# Patient Record
Sex: MALE | Race: OTHER | Employment: FULL TIME | URBAN - NONMETROPOLITAN AREA
[De-identification: names, ages, dates, MRNs, and addresses within clinical notes are randomized per-mention and may not be internally consistent; named-entity substitution may affect disease eponyms.]

---

## 2019-11-26 ENCOUNTER — OFFICE VISIT (OUTPATIENT)
Dept: FAMILY MEDICINE CLINIC | Age: 29
End: 2019-11-26
Payer: COMMERCIAL

## 2019-11-26 VITALS
HEIGHT: 70 IN | DIASTOLIC BLOOD PRESSURE: 82 MMHG | BODY MASS INDEX: 37.51 KG/M2 | WEIGHT: 262 LBS | HEART RATE: 77 BPM | OXYGEN SATURATION: 98 % | TEMPERATURE: 98.6 F | SYSTOLIC BLOOD PRESSURE: 132 MMHG

## 2019-11-26 DIAGNOSIS — Z23 NEED FOR INFLUENZA VACCINATION: ICD-10-CM

## 2019-11-26 DIAGNOSIS — Z76.89 ENCOUNTER TO ESTABLISH CARE: Primary | ICD-10-CM

## 2019-11-26 DIAGNOSIS — R06.81 WITNESSED APNEIC SPELLS: ICD-10-CM

## 2019-11-26 PROCEDURE — 90686 IIV4 VACC NO PRSV 0.5 ML IM: CPT | Performed by: FAMILY MEDICINE

## 2019-11-26 PROCEDURE — 99203 OFFICE O/P NEW LOW 30 MIN: CPT | Performed by: FAMILY MEDICINE

## 2019-11-26 PROCEDURE — 90471 IMMUNIZATION ADMIN: CPT | Performed by: FAMILY MEDICINE

## 2019-11-26 ASSESSMENT — ENCOUNTER SYMPTOMS
EYES NEGATIVE: 1
GASTROINTESTINAL NEGATIVE: 1
RESPIRATORY NEGATIVE: 1
ALLERGIC/IMMUNOLOGIC NEGATIVE: 1

## 2019-11-26 ASSESSMENT — PATIENT HEALTH QUESTIONNAIRE - PHQ9
SUM OF ALL RESPONSES TO PHQ QUESTIONS 1-9: 2
SUM OF ALL RESPONSES TO PHQ9 QUESTIONS 1 & 2: 2
1. LITTLE INTEREST OR PLEASURE IN DOING THINGS: 1
2. FEELING DOWN, DEPRESSED OR HOPELESS: 1
SUM OF ALL RESPONSES TO PHQ QUESTIONS 1-9: 2

## 2019-12-17 ENCOUNTER — HOSPITAL ENCOUNTER (OUTPATIENT)
Dept: SLEEP CENTER | Age: 29
Discharge: HOME | End: 2019-12-19
Payer: COMMERCIAL

## 2019-12-17 PROCEDURE — 95806 SLEEP STUDY UNATT&RESP EFFT: CPT | Performed by: PSYCHIATRY & NEUROLOGY

## 2019-12-17 PROCEDURE — G0399 HOME SLEEP TEST/TYPE 3 PORTA: HCPCS

## 2019-12-18 DIAGNOSIS — Z76.89 ENCOUNTER TO ESTABLISH CARE: ICD-10-CM

## 2019-12-18 LAB
ALBUMIN SERPL-MCNC: 4.3 G/DL (ref 3.5–5.2)
ALP BLD-CCNC: 109 U/L (ref 40–130)
ALT SERPL-CCNC: 238 U/L (ref 5–41)
ANION GAP SERPL CALCULATED.3IONS-SCNC: 19 MMOL/L (ref 7–19)
AST SERPL-CCNC: 112 U/L (ref 5–40)
BILIRUB SERPL-MCNC: 0.4 MG/DL (ref 0.2–1.2)
BILIRUBIN URINE: NEGATIVE
BLOOD, URINE: NEGATIVE
BUN BLDV-MCNC: 14 MG/DL (ref 6–20)
CALCIUM SERPL-MCNC: 9.8 MG/DL (ref 8.6–10)
CHLORIDE BLD-SCNC: 100 MMOL/L (ref 98–111)
CHOLESTEROL, TOTAL: 194 MG/DL (ref 160–199)
CLARITY: ABNORMAL
CO2: 21 MMOL/L (ref 22–29)
COLOR: ABNORMAL
CREAT SERPL-MCNC: 0.7 MG/DL (ref 0.5–1.2)
GFR NON-AFRICAN AMERICAN: >60
GLUCOSE BLD-MCNC: 111 MG/DL (ref 74–109)
GLUCOSE URINE: NEGATIVE MG/DL
HBA1C MFR BLD: 6.1 % (ref 4–6)
HCT VFR BLD CALC: 50.6 % (ref 42–52)
HDLC SERPL-MCNC: 35 MG/DL (ref 55–121)
HEMOGLOBIN: 16.1 G/DL (ref 14–18)
HIV-1 P24 AG: NORMAL
KETONES, URINE: NEGATIVE MG/DL
LDL CHOLESTEROL CALCULATED: 136 MG/DL
LEUKOCYTE ESTERASE, URINE: NEGATIVE
MCH RBC QN AUTO: 28 PG (ref 27–31)
MCHC RBC AUTO-ENTMCNC: 31.8 G/DL (ref 33–37)
MCV RBC AUTO: 87.8 FL (ref 80–94)
NITRITE, URINE: NEGATIVE
PDW BLD-RTO: 13.7 % (ref 11.5–14.5)
PH UA: 6 (ref 5–8)
PLATELET # BLD: 324 K/UL (ref 130–400)
PMV BLD AUTO: 9.6 FL (ref 9.4–12.4)
POTASSIUM SERPL-SCNC: 4 MMOL/L (ref 3.5–5)
PROSTATE SPECIFIC ANTIGEN: 0.77 NG/ML (ref 0–4)
PROTEIN UA: NEGATIVE MG/DL
RBC # BLD: 5.76 M/UL (ref 4.7–6.1)
SODIUM BLD-SCNC: 140 MMOL/L (ref 136–145)
SPECIFIC GRAVITY UA: 1.03 (ref 1–1.03)
TOTAL PROTEIN: 8.6 G/DL (ref 6.6–8.7)
TRIGL SERPL-MCNC: 115 MG/DL (ref 0–149)
TSH SERPL DL<=0.05 MIU/L-ACNC: 0.48 UIU/ML (ref 0.27–4.2)
UROBILINOGEN, URINE: 0.2 E.U./DL
WBC # BLD: 8.5 K/UL (ref 4.8–10.8)

## 2019-12-20 DIAGNOSIS — R74.8 ELEVATED LIVER ENZYMES: Primary | ICD-10-CM

## 2019-12-27 ENCOUNTER — HOSPITAL ENCOUNTER (OUTPATIENT)
Dept: GENERAL RADIOLOGY | Age: 29
Discharge: HOME | End: 2019-12-27
Payer: COMMERCIAL

## 2019-12-27 DIAGNOSIS — R74.8 ELEVATED LIVER ENZYMES: ICD-10-CM

## 2019-12-27 DIAGNOSIS — G47.33 SLEEP APNEA, OBSTRUCTIVE: Primary | ICD-10-CM

## 2019-12-27 LAB
ALBUMIN SERPL-MCNC: 4.3 G/DL (ref 3.5–5.2)
ALP BLD-CCNC: 102 U/L (ref 40–130)
ALT SERPL-CCNC: 222 U/L (ref 5–41)
ANION GAP SERPL CALCULATED.3IONS-SCNC: 13 MMOL/L (ref 7–19)
AST SERPL-CCNC: 115 U/L (ref 5–40)
BILIRUB SERPL-MCNC: 0.4 MG/DL (ref 0.2–1.2)
BUN BLDV-MCNC: 10 MG/DL (ref 6–20)
CALCIUM SERPL-MCNC: 9.6 MG/DL (ref 8.6–10)
CHLORIDE BLD-SCNC: 103 MMOL/L (ref 98–111)
CO2: 24 MMOL/L (ref 22–29)
CREAT SERPL-MCNC: 0.7 MG/DL (ref 0.5–1.2)
GFR NON-AFRICAN AMERICAN: >60
GLUCOSE BLD-MCNC: 92 MG/DL (ref 74–109)
HEPATITIS C ANTIBODY INTERPRETATION: NORMAL
POTASSIUM SERPL-SCNC: 4.4 MMOL/L (ref 3.5–5)
SODIUM BLD-SCNC: 140 MMOL/L (ref 136–145)
TOTAL PROTEIN: 8.4 G/DL (ref 6.6–8.7)

## 2019-12-27 PROCEDURE — 76705 ECHO EXAM OF ABDOMEN: CPT

## 2020-02-21 LAB
ALBUMIN SERPL-MCNC: 4.1 G/DL (ref 3.5–5.2)
ALP BLD-CCNC: 101 U/L (ref 40–130)
ALPHA FETOPROTEIN: 4.2 NG/ML (ref 0–8.3)
ALT SERPL-CCNC: 191 U/L (ref 5–41)
ANION GAP SERPL CALCULATED.3IONS-SCNC: 15 MMOL/L (ref 7–19)
AST SERPL-CCNC: 96 U/L (ref 5–40)
BASOPHILS ABSOLUTE: 0.1 K/UL (ref 0–0.2)
BASOPHILS RELATIVE PERCENT: 0.9 % (ref 0–1)
BILIRUB SERPL-MCNC: 0.3 MG/DL (ref 0.2–1.2)
BUN BLDV-MCNC: 12 MG/DL (ref 6–20)
CALCIUM SERPL-MCNC: 9.2 MG/DL (ref 8.6–10)
CHLORIDE BLD-SCNC: 103 MMOL/L (ref 98–111)
CO2: 21 MMOL/L (ref 22–29)
CREAT SERPL-MCNC: 0.7 MG/DL (ref 0.5–1.2)
EOSINOPHILS ABSOLUTE: 0.1 K/UL (ref 0–0.6)
EOSINOPHILS RELATIVE PERCENT: 1.5 % (ref 0–5)
GFR NON-AFRICAN AMERICAN: >60
GLUCOSE BLD-MCNC: 110 MG/DL (ref 74–109)
HAV IGM SER IA-ACNC: NORMAL
HCT VFR BLD CALC: 49.2 % (ref 42–52)
HEMOGLOBIN: 15.5 G/DL (ref 14–18)
HEPATITIS B CORE IGM ANTIBODY: NORMAL
HEPATITIS B SURFACE ANTIGEN INTERPRETATION: NORMAL
HEPATITIS C ANTIBODY INTERPRETATION: NORMAL
IMMATURE GRANULOCYTES #: 0.1 K/UL
INR BLD: 1.04 (ref 0.88–1.18)
IRON: 57 UG/DL (ref 59–158)
LYMPHOCYTES ABSOLUTE: 2.8 K/UL (ref 1.1–4.5)
LYMPHOCYTES RELATIVE PERCENT: 37.1 % (ref 20–40)
MCH RBC QN AUTO: 27.6 PG (ref 27–31)
MCHC RBC AUTO-ENTMCNC: 31.5 G/DL (ref 33–37)
MCV RBC AUTO: 87.5 FL (ref 80–94)
MONOCYTES ABSOLUTE: 0.6 K/UL (ref 0–0.9)
MONOCYTES RELATIVE PERCENT: 7.5 % (ref 0–10)
NEUTROPHILS ABSOLUTE: 3.9 K/UL (ref 1.5–7.5)
NEUTROPHILS RELATIVE PERCENT: 52.3 % (ref 50–65)
PDW BLD-RTO: 13.6 % (ref 11.5–14.5)
PLATELET # BLD: 312 K/UL (ref 130–400)
PMV BLD AUTO: 9.8 FL (ref 9.4–12.4)
POTASSIUM SERPL-SCNC: 4.3 MMOL/L (ref 3.5–5)
PROTHROMBIN TIME: 13.5 SEC (ref 12–14.6)
RBC # BLD: 5.62 M/UL (ref 4.7–6.1)
SODIUM BLD-SCNC: 139 MMOL/L (ref 136–145)
TOTAL IRON BINDING CAPACITY: 363 UG/DL (ref 250–400)
TOTAL PROTEIN: 8.1 G/DL (ref 6.6–8.7)
WBC # BLD: 7.5 K/UL (ref 4.8–10.8)

## 2020-02-24 LAB
ALPHA-1 ANTITRYPSIN: 140 MG/DL (ref 90–200)
CERULOPLASMIN: 30 MG/DL (ref 17–54)
F-ACTIN AB IGG: 20 UNITS (ref 0–19)
MITOCHONDRIAL M2 AB, IGG: 8.8 UNITS (ref 0–20)
SMOOTH MUSCLE AB IGG TITER: ABNORMAL

## 2020-02-25 LAB
ALPHA 2-MACROGLOBULINS, QN: 148 MG/DL (ref 110–276)
ALT SERPL-CCNC: 219 IU/L (ref 0–55)
BILIRUB SERPL-MCNC: 0.2 MG/DL (ref 0–1.2)
COMMENT: ABNORMAL
FIBROSIS SCORE: ABNORMAL
FIBROSIS STAGE: ABNORMAL
GGT: 116 IU/L (ref 0–65)
HAPTOGLOBIN: 198 MG/DL (ref 17–317)
INTERPRETATIONS:: ABNORMAL
LIMITATIONS:: ABNORMAL
LIPOPROTEIN (A): 102 MG/DL (ref 101–178)
NECROINFLAMM ACTIVITY SCORING:: ABNORMAL
NECROINFLAMMAT ACTIVITY GRADE: ABNORMAL
NECROINFLAMMAT ACTIVITY SCORE: 0.78 (ref 0–0.17)

## 2020-09-25 LAB
ALBUMIN SERPL-MCNC: 4.2 G/DL (ref 3.5–5.2)
ALP BLD-CCNC: 103 U/L (ref 40–130)
ALT SERPL-CCNC: 172 U/L (ref 5–41)
ANION GAP SERPL CALCULATED.3IONS-SCNC: 11 MMOL/L (ref 7–19)
AST SERPL-CCNC: 83 U/L (ref 5–40)
BASOPHILS ABSOLUTE: 0 K/UL (ref 0–0.2)
BASOPHILS RELATIVE PERCENT: 0.6 % (ref 0–1)
BILIRUB SERPL-MCNC: 0.6 MG/DL (ref 0.2–1.2)
BUN BLDV-MCNC: 14 MG/DL (ref 6–20)
CALCIUM SERPL-MCNC: 9.5 MG/DL (ref 8.6–10)
CHLORIDE BLD-SCNC: 105 MMOL/L (ref 98–111)
CO2: 24 MMOL/L (ref 22–29)
CREAT SERPL-MCNC: 0.7 MG/DL (ref 0.5–1.2)
EOSINOPHILS ABSOLUTE: 0.1 K/UL (ref 0–0.6)
EOSINOPHILS RELATIVE PERCENT: 1.5 % (ref 0–5)
GFR AFRICAN AMERICAN: >59
GFR NON-AFRICAN AMERICAN: >60
GLUCOSE BLD-MCNC: 107 MG/DL (ref 74–109)
HBA1C MFR BLD: 5.9 % (ref 4–6)
HCT VFR BLD CALC: 49.4 % (ref 42–52)
HEMOGLOBIN: 15.9 G/DL (ref 14–18)
IMMATURE GRANULOCYTES #: 0 K/UL
LYMPHOCYTES ABSOLUTE: 3 K/UL (ref 1.1–4.5)
LYMPHOCYTES RELATIVE PERCENT: 45.4 % (ref 20–40)
MCH RBC QN AUTO: 27 PG (ref 27–31)
MCHC RBC AUTO-ENTMCNC: 32.2 G/DL (ref 33–37)
MCV RBC AUTO: 83.9 FL (ref 80–94)
MONOCYTES ABSOLUTE: 0.5 K/UL (ref 0–0.9)
MONOCYTES RELATIVE PERCENT: 7.1 % (ref 0–10)
NEUTROPHILS ABSOLUTE: 3 K/UL (ref 1.5–7.5)
NEUTROPHILS RELATIVE PERCENT: 44.9 % (ref 50–65)
PDW BLD-RTO: 13.9 % (ref 11.5–14.5)
PLATELET # BLD: 327 K/UL (ref 130–400)
PMV BLD AUTO: 9.9 FL (ref 9.4–12.4)
POTASSIUM SERPL-SCNC: 4.1 MMOL/L (ref 3.5–5)
RBC # BLD: 5.89 M/UL (ref 4.7–6.1)
SODIUM BLD-SCNC: 140 MMOL/L (ref 136–145)
TOTAL PROTEIN: 8.3 G/DL (ref 6.6–8.7)
WBC # BLD: 6.6 K/UL (ref 4.8–10.8)

## 2020-09-29 LAB — MITOCHONDRIAL M2 AB, IGG: 6.6 UNITS (ref 0–24.9)

## 2020-10-01 LAB
F-ACTIN AB IGG: 20 UNITS (ref 0–19)
SMOOTH MUSCLE AB IGG TITER: ABNORMAL

## 2021-06-29 ENCOUNTER — HOSPITAL ENCOUNTER (EMERGENCY)
Age: 31
Discharge: HOME | End: 2021-06-29
Attending: EMERGENCY MEDICINE
Payer: COMMERCIAL

## 2021-06-29 ENCOUNTER — APPOINTMENT (OUTPATIENT)
Dept: GENERAL RADIOLOGY | Age: 31
End: 2021-06-29
Payer: COMMERCIAL

## 2021-06-29 ENCOUNTER — APPOINTMENT (OUTPATIENT)
Dept: CT IMAGING | Age: 31
End: 2021-06-29
Payer: COMMERCIAL

## 2021-06-29 VITALS
WEIGHT: 260 LBS | BODY MASS INDEX: 37.31 KG/M2 | TEMPERATURE: 98.4 F | HEART RATE: 78 BPM | SYSTOLIC BLOOD PRESSURE: 136 MMHG | RESPIRATION RATE: 16 BRPM | DIASTOLIC BLOOD PRESSURE: 72 MMHG | OXYGEN SATURATION: 98 %
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
## 2021-06-29 DIAGNOSIS — S20.219A CONTUSION OF CHEST WALL, UNSPECIFIED LATERALITY, INITIAL ENCOUNTER: Primary | ICD-10-CM

## 2021-06-29 DIAGNOSIS — S30.1XXA CONTUSION OF ABDOMINAL WALL, INITIAL ENCOUNTER: ICD-10-CM

## 2021-06-29 DIAGNOSIS — S46.912A STRAIN OF LEFT SHOULDER, INITIAL ENCOUNTER: ICD-10-CM

## 2021-06-29 LAB
ALBUMIN SERPL-MCNC: 3.8 G/DL (ref 3.5–5.2)
ALP BLD-CCNC: 93 U/L (ref 40–130)
ALT SERPL-CCNC: 155 U/L (ref 5–41)
ANION GAP SERPL CALCULATED.3IONS-SCNC: 11 MMOL/L (ref 7–19)
AST SERPL-CCNC: 78 U/L (ref 5–40)
BASOPHILS ABSOLUTE: 0.1 K/UL (ref 0–0.2)
BASOPHILS RELATIVE PERCENT: 0.5 % (ref 0–1)
BILIRUB SERPL-MCNC: 0.4 MG/DL (ref 0.2–1.2)
BUN BLDV-MCNC: 15 MG/DL (ref 6–20)
CALCIUM SERPL-MCNC: 8.8 MG/DL (ref 8.6–10)
CHLORIDE BLD-SCNC: 101 MMOL/L (ref 98–111)
CO2: 21 MMOL/L (ref 22–29)
CREAT SERPL-MCNC: 0.7 MG/DL (ref 0.5–1.2)
EOSINOPHILS ABSOLUTE: 0.1 K/UL (ref 0–0.6)
EOSINOPHILS RELATIVE PERCENT: 0.4 % (ref 0–5)
GFR AFRICAN AMERICAN: >59
GFR NON-AFRICAN AMERICAN: >60
GLUCOSE BLD-MCNC: 110 MG/DL (ref 74–109)
HCT VFR BLD CALC: 43.2 % (ref 42–52)
HEMOGLOBIN: 13.9 G/DL (ref 14–18)
IMMATURE GRANULOCYTES #: 0.1 K/UL
LYMPHOCYTES ABSOLUTE: 2.6 K/UL (ref 1.1–4.5)
LYMPHOCYTES RELATIVE PERCENT: 18 % (ref 20–40)
MCH RBC QN AUTO: 27.3 PG (ref 27–31)
MCHC RBC AUTO-ENTMCNC: 32.2 G/DL (ref 33–37)
MCV RBC AUTO: 84.9 FL (ref 80–94)
MONOCYTES ABSOLUTE: 0.9 K/UL (ref 0–0.9)
MONOCYTES RELATIVE PERCENT: 6.1 % (ref 0–10)
NEUTROPHILS ABSOLUTE: 10.8 K/UL (ref 1.5–7.5)
NEUTROPHILS RELATIVE PERCENT: 74.2 % (ref 50–65)
PDW BLD-RTO: 13.9 % (ref 11.5–14.5)
PLATELET # BLD: 275 K/UL (ref 130–400)
PMV BLD AUTO: 9.2 FL (ref 9.4–12.4)
POTASSIUM REFLEX MAGNESIUM: 4 MMOL/L (ref 3.5–5)
RBC # BLD: 5.09 M/UL (ref 4.7–6.1)
SODIUM BLD-SCNC: 133 MMOL/L (ref 136–145)
TOTAL PROTEIN: 7.4 G/DL (ref 6.6–8.7)
TROPONIN: <0.01 NG/ML (ref 0–0.03)
WBC # BLD: 14.5 K/UL (ref 4.8–10.8)

## 2021-06-29 PROCEDURE — 73030 X-RAY EXAM OF SHOULDER: CPT

## 2021-06-29 PROCEDURE — 96375 TX/PRO/DX INJ NEW DRUG ADDON: CPT

## 2021-06-29 PROCEDURE — 74177 CT ABD & PELVIS W/CONTRAST: CPT

## 2021-06-29 PROCEDURE — 99285 EMERGENCY DEPT VISIT HI MDM: CPT

## 2021-06-29 PROCEDURE — 6360000002 HC RX W HCPCS: Performed by: NURSE PRACTITIONER

## 2021-06-29 PROCEDURE — 71260 CT THORAX DX C+: CPT

## 2021-06-29 PROCEDURE — 93005 ELECTROCARDIOGRAM TRACING: CPT

## 2021-06-29 PROCEDURE — 80053 COMPREHEN METABOLIC PANEL: CPT

## 2021-06-29 PROCEDURE — 72125 CT NECK SPINE W/O DYE: CPT

## 2021-06-29 PROCEDURE — 96374 THER/PROPH/DIAG INJ IV PUSH: CPT

## 2021-06-29 PROCEDURE — 6360000004 HC RX CONTRAST MEDICATION: Performed by: NURSE PRACTITIONER

## 2021-06-29 PROCEDURE — 84484 ASSAY OF TROPONIN QUANT: CPT

## 2021-06-29 PROCEDURE — 85025 COMPLETE CBC W/AUTO DIFF WBC: CPT

## 2021-06-29 PROCEDURE — 36415 COLL VENOUS BLD VENIPUNCTURE: CPT

## 2021-06-29 PROCEDURE — 70450 CT HEAD/BRAIN W/O DYE: CPT

## 2021-06-29 RX ORDER — ONDANSETRON 2 MG/ML
4 INJECTION INTRAMUSCULAR; INTRAVENOUS ONCE
Status: COMPLETED | OUTPATIENT
Start: 2021-06-29 | End: 2021-06-29

## 2021-06-29 RX ORDER — HYDROMORPHONE HYDROCHLORIDE 1 MG/ML
0.5 INJECTION, SOLUTION INTRAMUSCULAR; INTRAVENOUS; SUBCUTANEOUS ONCE
Status: COMPLETED | OUTPATIENT
Start: 2021-06-29 | End: 2021-06-29

## 2021-06-29 RX ORDER — MORPHINE SULFATE 4 MG/ML
4 INJECTION, SOLUTION INTRAMUSCULAR; INTRAVENOUS ONCE
Status: COMPLETED | OUTPATIENT
Start: 2021-06-29 | End: 2021-06-29

## 2021-06-29 RX ORDER — HYDROCODONE BITARTRATE AND ACETAMINOPHEN 5; 325 MG/1; MG/1
1 TABLET ORAL EVERY 6 HOURS PRN
Qty: 12 TABLET | Refills: 0 | Status: SHIPPED | OUTPATIENT
Start: 2021-06-29 | End: 2021-07-02

## 2021-06-29 RX ORDER — PROMETHAZINE HYDROCHLORIDE 25 MG/ML
6.25 INJECTION, SOLUTION INTRAMUSCULAR; INTRAVENOUS ONCE
Status: COMPLETED | OUTPATIENT
Start: 2021-06-29 | End: 2021-06-29

## 2021-06-29 RX ORDER — METHOCARBAMOL 500 MG/1
500 TABLET, FILM COATED ORAL 3 TIMES DAILY
Qty: 20 TABLET | Refills: 0 | Status: SHIPPED | OUTPATIENT
Start: 2021-06-29 | End: 2021-07-06

## 2021-06-29 RX ADMIN — PROMETHAZINE HYDROCHLORIDE 6.25 MG: 25 INJECTION INTRAMUSCULAR; INTRAVENOUS at 20:56

## 2021-06-29 RX ADMIN — HYDROMORPHONE HYDROCHLORIDE 0.5 MG: 1 INJECTION, SOLUTION INTRAMUSCULAR; INTRAVENOUS; SUBCUTANEOUS at 20:56

## 2021-06-29 RX ADMIN — ONDANSETRON 4 MG: 2 INJECTION INTRAMUSCULAR; INTRAVENOUS at 20:35

## 2021-06-29 RX ADMIN — IOPAMIDOL 90 ML: 755 INJECTION, SOLUTION INTRAVENOUS at 20:34

## 2021-06-29 RX ADMIN — MORPHINE SULFATE 4 MG: 4 INJECTION, SOLUTION INTRAMUSCULAR; INTRAVENOUS at 20:35

## 2021-06-29 ASSESSMENT — ENCOUNTER SYMPTOMS: ABDOMINAL PAIN: 1

## 2021-06-29 ASSESSMENT — PAIN DESCRIPTION - ORIENTATION: ORIENTATION: LEFT

## 2021-06-29 ASSESSMENT — PAIN DESCRIPTION - PAIN TYPE: TYPE: ACUTE PAIN

## 2021-06-29 ASSESSMENT — PAIN DESCRIPTION - DESCRIPTORS: DESCRIPTORS: ACHING

## 2021-06-29 ASSESSMENT — PAIN DESCRIPTION - LOCATION: LOCATION: SHOULDER

## 2021-06-29 ASSESSMENT — PAIN SCALES - GENERAL
PAINLEVEL_OUTOF10: 3
PAINLEVEL_OUTOF10: 5
PAINLEVEL_OUTOF10: 1
PAINLEVEL_OUTOF10: 5
PAINLEVEL_OUTOF10: 1

## 2021-06-29 ASSESSMENT — PAIN - FUNCTIONAL ASSESSMENT: PAIN_FUNCTIONAL_ASSESSMENT: 0-10

## 2021-06-29 ASSESSMENT — PAIN DESCRIPTION - PROGRESSION: CLINICAL_PROGRESSION: GRADUALLY IMPROVING
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
## 2021-06-30 NOTE — ED NOTES
Assisted pt with ambulation in [place]. Pt ambulated approximately 60ft per self without complaints of dizziness or near syncope. Pt back to bed.      Danielle Ramirez RN  06/29/21 5019

## 2021-07-03 LAB
EKG P AXIS: 37 DEGREES
EKG P-R INTERVAL: 174 MS
EKG Q-T INTERVAL: 330 MS
EKG QRS DURATION: 82 MS
EKG QTC CALCULATION (BAZETT): 393 MS
EKG T AXIS: 13 DEGREES

## 2024-08-07 ENCOUNTER — OFFICE VISIT (OUTPATIENT)
Dept: FAMILY MEDICINE CLINIC | Age: 34
End: 2024-08-07
Payer: COMMERCIAL

## 2024-08-07 VITALS
WEIGHT: 253.5 LBS | DIASTOLIC BLOOD PRESSURE: 92 MMHG | TEMPERATURE: 98.4 F | SYSTOLIC BLOOD PRESSURE: 138 MMHG | BODY MASS INDEX: 36.37 KG/M2 | HEART RATE: 107 BPM | OXYGEN SATURATION: 97 %

## 2024-08-07 DIAGNOSIS — R73.02 GLUCOSE INTOLERANCE (IMPAIRED GLUCOSE TOLERANCE): Primary | ICD-10-CM

## 2024-08-07 DIAGNOSIS — R73.02 GLUCOSE INTOLERANCE (IMPAIRED GLUCOSE TOLERANCE): ICD-10-CM

## 2024-08-07 DIAGNOSIS — N48.89 SWELLING OF PENIS: ICD-10-CM

## 2024-08-07 LAB
ALBUMIN SERPL-MCNC: 4 G/DL (ref 3.5–5.2)
ALP SERPL-CCNC: 126 U/L (ref 40–130)
ALT SERPL-CCNC: 104 U/L (ref 5–41)
ANION GAP SERPL CALCULATED.3IONS-SCNC: 15 MMOL/L (ref 7–19)
AST SERPL-CCNC: 71 U/L (ref 5–40)
BACTERIA URNS QL MICRO: NEGATIVE /HPF
BILIRUB SERPL-MCNC: 0.2 MG/DL (ref 0.2–1.2)
BILIRUB UR QL STRIP: NEGATIVE
BUN SERPL-MCNC: 16 MG/DL (ref 6–20)
CALCIUM SERPL-MCNC: 9.5 MG/DL (ref 8.6–10)
CHLORIDE SERPL-SCNC: 101 MMOL/L (ref 98–111)
CHOLEST SERPL-MCNC: 210 MG/DL (ref 160–199)
CLARITY UR: CLEAR
CO2 SERPL-SCNC: 21 MMOL/L (ref 22–29)
COLOR UR: YELLOW
CREAT SERPL-MCNC: 0.7 MG/DL (ref 0.5–1.2)
CREAT UR-MCNC: 147.6 MG/DL (ref 39–259)
CRYSTALS URNS MICRO: NORMAL /HPF
EPI CELLS #/AREA URNS AUTO: 2 /HPF (ref 0–5)
ERYTHROCYTE [DISTWIDTH] IN BLOOD BY AUTOMATED COUNT: 13.3 % (ref 11.5–14.5)
GLUCOSE SERPL-MCNC: 175 MG/DL (ref 74–109)
GLUCOSE UR STRIP.AUTO-MCNC: NEGATIVE MG/DL
HBA1C MFR BLD: 10.4 % (ref 4–6)
HCT VFR BLD AUTO: 48.1 % (ref 42–52)
HDLC SERPL-MCNC: 28 MG/DL (ref 55–121)
HGB BLD-MCNC: 14.7 G/DL (ref 14–18)
HGB UR STRIP.AUTO-MCNC: NEGATIVE MG/L
HYALINE CASTS #/AREA URNS AUTO: 1 /HPF (ref 0–8)
KETONES UR STRIP.AUTO-MCNC: ABNORMAL MG/DL
LDLC SERPL CALC-MCNC: 124 MG/DL
LEUKOCYTE ESTERASE UR QL STRIP.AUTO: NEGATIVE
MCH RBC QN AUTO: 26.6 PG (ref 27–31)
MCHC RBC AUTO-ENTMCNC: 30.6 G/DL (ref 33–37)
MCV RBC AUTO: 87.1 FL (ref 80–94)
MICROALBUMIN UR-MCNC: 11 MG/DL (ref 0–19)
MICROALBUMIN/CREAT UR-RTO: 74.5 MG/G
NITRITE UR QL STRIP.AUTO: NEGATIVE
PH UR STRIP.AUTO: 5.5 [PH] (ref 5–8)
PLATELET # BLD AUTO: 304 K/UL (ref 130–400)
PMV BLD AUTO: 9.8 FL (ref 9.4–12.4)
POTASSIUM SERPL-SCNC: 3.9 MMOL/L (ref 3.5–5)
PROT SERPL-MCNC: 8.5 G/DL (ref 6.6–8.7)
PROT UR STRIP.AUTO-MCNC: 30 MG/DL
RBC # BLD AUTO: 5.52 M/UL (ref 4.7–6.1)
RBC #/AREA URNS AUTO: 1 /HPF (ref 0–4)
SODIUM SERPL-SCNC: 137 MMOL/L (ref 136–145)
SP GR UR STRIP.AUTO: 1.02 (ref 1–1.03)
TRIGL SERPL-MCNC: 290 MG/DL (ref 0–149)
UROBILINOGEN UR STRIP.AUTO-MCNC: 1 E.U./DL
WBC # BLD AUTO: 10.5 K/UL (ref 4.8–10.8)
WBC #/AREA URNS AUTO: 1 /HPF (ref 0–5)

## 2024-08-07 PROCEDURE — 99214 OFFICE O/P EST MOD 30 MIN: CPT | Performed by: FAMILY MEDICINE

## 2024-08-07 RX ORDER — NYSTATIN 100000 [USP'U]/G
POWDER TOPICAL 4 TIMES DAILY
COMMUNITY

## 2024-08-07 RX ORDER — NYSTATIN 100000 U/G
OINTMENT TOPICAL 2 TIMES DAILY
COMMUNITY
End: 2024-08-07

## 2024-08-07 RX ORDER — CEPHALEXIN 500 MG/1
500 CAPSULE ORAL 3 TIMES DAILY
COMMUNITY

## 2024-08-07 RX ORDER — LANCETS 28 GAUGE
EACH MISCELLANEOUS
Qty: 100 EACH | Refills: 5 | Status: SHIPPED | OUTPATIENT
Start: 2024-08-07

## 2024-08-07 RX ORDER — FLUCONAZOLE 200 MG/1
200 TABLET ORAL DAILY
COMMUNITY

## 2024-08-07 RX ORDER — BLOOD-GLUCOSE METER
1 KIT MISCELLANEOUS DAILY
Qty: 1 KIT | Refills: 0 | Status: SHIPPED | OUTPATIENT
Start: 2024-08-07

## 2024-08-07 RX ORDER — BLOOD SUGAR DIAGNOSTIC
STRIP MISCELLANEOUS
Qty: 100 STRIP | Refills: 5 | Status: SHIPPED | OUTPATIENT
Start: 2024-08-07

## 2024-08-07 SDOH — ECONOMIC STABILITY: HOUSING INSECURITY
IN THE LAST 12 MONTHS, WAS THERE A TIME WHEN YOU DID NOT HAVE A STEADY PLACE TO SLEEP OR SLEPT IN A SHELTER (INCLUDING NOW)?: NO

## 2024-08-07 SDOH — ECONOMIC STABILITY: FOOD INSECURITY: WITHIN THE PAST 12 MONTHS, THE FOOD YOU BOUGHT JUST DIDN'T LAST AND YOU DIDN'T HAVE MONEY TO GET MORE.: NEVER TRUE

## 2024-08-07 SDOH — ECONOMIC STABILITY: FOOD INSECURITY: WITHIN THE PAST 12 MONTHS, YOU WORRIED THAT YOUR FOOD WOULD RUN OUT BEFORE YOU GOT MONEY TO BUY MORE.: NEVER TRUE

## 2024-08-07 SDOH — ECONOMIC STABILITY: INCOME INSECURITY: HOW HARD IS IT FOR YOU TO PAY FOR THE VERY BASICS LIKE FOOD, HOUSING, MEDICAL CARE, AND HEATING?: NOT HARD AT ALL

## 2024-08-07 ASSESSMENT — PATIENT HEALTH QUESTIONNAIRE - PHQ9
SUM OF ALL RESPONSES TO PHQ9 QUESTIONS 1 & 2: 0
1. LITTLE INTEREST OR PLEASURE IN DOING THINGS: NOT AT ALL
7. TROUBLE CONCENTRATING ON THINGS, SUCH AS READING THE NEWSPAPER OR WATCHING TELEVISION: NOT AT ALL
SUM OF ALL RESPONSES TO PHQ QUESTIONS 1-9: 7
9. THOUGHTS THAT YOU WOULD BE BETTER OFF DEAD, OR OF HURTING YOURSELF: NOT AT ALL
4. FEELING TIRED OR HAVING LITTLE ENERGY: NEARLY EVERY DAY
5. POOR APPETITE OR OVEREATING: SEVERAL DAYS
10. IF YOU CHECKED OFF ANY PROBLEMS, HOW DIFFICULT HAVE THESE PROBLEMS MADE IT FOR YOU TO DO YOUR WORK, TAKE CARE OF THINGS AT HOME, OR GET ALONG WITH OTHER PEOPLE: SOMEWHAT DIFFICULT
2. FEELING DOWN, DEPRESSED OR HOPELESS: NOT AT ALL
3. TROUBLE FALLING OR STAYING ASLEEP: NEARLY EVERY DAY
8. MOVING OR SPEAKING SO SLOWLY THAT OTHER PEOPLE COULD HAVE NOTICED. OR THE OPPOSITE, BEING SO FIGETY OR RESTLESS THAT YOU HAVE BEEN MOVING AROUND A LOT MORE THAN USUAL: NOT AT ALL
6. FEELING BAD ABOUT YOURSELF - OR THAT YOU ARE A FAILURE OR HAVE LET YOURSELF OR YOUR FAMILY DOWN: NOT AT ALL
SUM OF ALL RESPONSES TO PHQ QUESTIONS 1-9: 7

## 2024-08-07 ASSESSMENT — ENCOUNTER SYMPTOMS
GASTROINTESTINAL NEGATIVE: 1
ALLERGIC/IMMUNOLOGIC NEGATIVE: 1
EYES NEGATIVE: 1
RESPIRATORY NEGATIVE: 1

## 2024-08-07 NOTE — PROGRESS NOTES
SUBJECTIVE:    Patient ID: Hola Palomares is a 34 y.o.male.    HPI:   Patient is here for evaluation of penile swelling  Patient is a 34-year-old male.  He complained of penile swelling for the last couple of days.  Apparently he has a history of fungal infections in penile area.  He went to urgent care.  He was given Keflex Diflucan and nystatin cream.  Erythema seems to be improving but he has been having swelling the penile head.  It is uncomfortable to him.  No blood compromise.  He is able to urinate.  He was told that his blood sugar was high he need to see somebody.  Looks like in 2019 he have some glucose intolerance.         No past medical history on file.   Current Outpatient Medications   Medication Sig Dispense Refill    cephALEXin (KEFLEX) 500 MG capsule Take 1 capsule by mouth 3 times daily      fluconazole (DIFLUCAN) 200 MG tablet Take 1 tablet by mouth daily      nystatin (MYCOSTATIN) 899337 UNIT/GM ointment Apply topically 2 times daily Apply topically 2 times daily.      nystatin (MYCOSTATIN) 444470 UNIT/GM powder Apply topically 4 times daily Apply topically 4 times daily.      miconazole (MICOTIN) 2 % cream Apply topically 2 times daily. 1 each 0     No current facility-administered medications for this visit.      No Known Allergies    Review of Systems   Constitutional: Negative.    HENT: Negative.     Eyes: Negative.    Respiratory: Negative.     Cardiovascular: Negative.    Gastrointestinal: Negative.    Endocrine: Negative.    Genitourinary: Negative.    Musculoskeletal: Negative.    Skin: Negative.    Allergic/Immunologic: Negative.    Neurological: Negative.    Hematological: Negative.    Psychiatric/Behavioral: Negative.         OBJECTIVE:    Physical Exam  Vitals reviewed.   Constitutional:       Appearance: Normal appearance. He is well-developed.   HENT:      Head: Normocephalic and atraumatic.      Right Ear: Tympanic membrane, ear canal and external ear normal. There is no impacted

## 2024-08-09 DIAGNOSIS — R73.02 GLUCOSE INTOLERANCE (IMPAIRED GLUCOSE TOLERANCE): Primary | ICD-10-CM

## 2024-08-14 ENCOUNTER — OFFICE VISIT (OUTPATIENT)
Dept: FAMILY MEDICINE CLINIC | Age: 34
End: 2024-08-14
Payer: COMMERCIAL

## 2024-08-14 VITALS
DIASTOLIC BLOOD PRESSURE: 86 MMHG | HEART RATE: 74 BPM | WEIGHT: 255 LBS | OXYGEN SATURATION: 98 % | SYSTOLIC BLOOD PRESSURE: 130 MMHG | TEMPERATURE: 97.7 F | BODY MASS INDEX: 36.59 KG/M2

## 2024-08-14 DIAGNOSIS — N48.89 SWELLING OF PENIS: ICD-10-CM

## 2024-08-14 DIAGNOSIS — E11.9 TYPE 2 DIABETES MELLITUS WITHOUT COMPLICATION, WITHOUT LONG-TERM CURRENT USE OF INSULIN (HCC): Primary | ICD-10-CM

## 2024-08-14 PROCEDURE — 99214 OFFICE O/P EST MOD 30 MIN: CPT | Performed by: FAMILY MEDICINE

## 2024-08-14 PROCEDURE — 3046F HEMOGLOBIN A1C LEVEL >9.0%: CPT | Performed by: FAMILY MEDICINE

## 2024-08-14 SDOH — ECONOMIC STABILITY: FOOD INSECURITY: WITHIN THE PAST 12 MONTHS, YOU WORRIED THAT YOUR FOOD WOULD RUN OUT BEFORE YOU GOT MONEY TO BUY MORE.: NEVER TRUE

## 2024-08-14 SDOH — ECONOMIC STABILITY: FOOD INSECURITY: WITHIN THE PAST 12 MONTHS, THE FOOD YOU BOUGHT JUST DIDN'T LAST AND YOU DIDN'T HAVE MONEY TO GET MORE.: NEVER TRUE

## 2024-08-14 SDOH — ECONOMIC STABILITY: INCOME INSECURITY: HOW HARD IS IT FOR YOU TO PAY FOR THE VERY BASICS LIKE FOOD, HOUSING, MEDICAL CARE, AND HEATING?: NOT HARD AT ALL

## 2024-08-14 ASSESSMENT — PATIENT HEALTH QUESTIONNAIRE - PHQ9
SUM OF ALL RESPONSES TO PHQ QUESTIONS 1-9: 0
SUM OF ALL RESPONSES TO PHQ9 QUESTIONS 1 & 2: 0
2. FEELING DOWN, DEPRESSED OR HOPELESS: NOT AT ALL
1. LITTLE INTEREST OR PLEASURE IN DOING THINGS: NOT AT ALL
SUM OF ALL RESPONSES TO PHQ QUESTIONS 1-9: 0

## 2024-08-14 ASSESSMENT — ENCOUNTER SYMPTOMS
ALLERGIC/IMMUNOLOGIC NEGATIVE: 1
EYES NEGATIVE: 1
RESPIRATORY NEGATIVE: 1
GASTROINTESTINAL NEGATIVE: 1

## 2024-08-14 NOTE — PROGRESS NOTES
Right Ear: Tympanic membrane, ear canal and external ear normal. There is no impacted cerumen.      Left Ear: Tympanic membrane, ear canal and external ear normal. There is no impacted cerumen.      Nose: Nose normal.      Mouth/Throat:      Lips: Pink.      Mouth: Mucous membranes are moist.      Dentition: Normal dentition.      Tongue: No lesions.      Pharynx: Oropharynx is clear. Uvula midline.      Tonsils: No tonsillar exudate or tonsillar abscesses.   Eyes:      General: Lids are normal.         Right eye: No discharge.         Left eye: No discharge.      Extraocular Movements:      Right eye: Normal extraocular motion.      Left eye: Normal extraocular motion.      Conjunctiva/sclera: Conjunctivae normal.      Right eye: Right conjunctiva is not injected.      Left eye: Left conjunctiva is not injected.      Pupils: Pupils are equal, round, and reactive to light.   Neck:      Thyroid: No thyromegaly.      Vascular: No carotid bruit or JVD.   Cardiovascular:      Rate and Rhythm: Normal rate and regular rhythm.      Pulses:           Carotid pulses are 2+ on the right side and 2+ on the left side.       Radial pulses are 2+ on the right side and 2+ on the left side.      Heart sounds: Normal heart sounds, S1 normal and S2 normal. No murmur heard.  Pulmonary:      Effort: Pulmonary effort is normal. No accessory muscle usage.      Breath sounds: Normal breath sounds.   Abdominal:      General: Bowel sounds are normal. There is no distension or abdominal bruit.      Palpations: Abdomen is soft. There is no mass.      Tenderness: There is no abdominal tenderness.      Hernia: No hernia is present.   Genitourinary:     Comments: Penile swelling but improved compared to last week  Musculoskeletal:         General: Normal range of motion.      Cervical back: Normal range of motion and neck supple.      Right lower leg: No edema.      Left lower leg: No edema.   Lymphadenopathy:      Cervical:      Right

## 2024-08-15 NOTE — PROGRESS NOTES
"Subjective    Mr. Alejo is 34 y.o. male    Chief Complaint: Penile Swelling    History of Present Illness  Patient is a 34-year-old gentleman who has had approximately 3 different episodes of what sounds like fungal infection involving the foreskin and penis.  Patient has used several different topical medicines which has helped with the rash but he is currently having some swelling which she has had before not as painful.  He is noncircumcised.    The following portions of the patient's history were reviewed and updated as appropriate: allergies, current medications, past family history, past medical history, past social history, past surgical history and problem list.    Review of Systems   Genitourinary:  Positive for penile pain and penile swelling.         Current Outpatient Medications:     Blood Glucose Monitoring Suppl (FreeStyle Freedom Lite) w/Device kit, See Admin Instructions., Disp: , Rfl:     metFORMIN (GLUCOPHAGE) 500 MG tablet, Take 1 tablet by mouth., Disp: , Rfl:     clotrimazole-betamethasone (LOTRISONE) 1-0.05 % cream, Apply 1 Application topically to the appropriate area as directed 2 (Two) Times a Day for 28 days. After cleaning the affected area, apply twice daily as directed, Disp: 45 g, Rfl: 0    Past Medical History:   Diagnosis Date    Diabetes mellitus        No past surgical history on file.    Social History     Socioeconomic History    Marital status: Single   Tobacco Use    Smoking status: Every Day     Current packs/day: 0.25     Average packs/day: 0.3 packs/day for 6.0 years (1.5 ttl pk-yrs)     Types: Cigarettes     Start date: 8/20/2018    Smokeless tobacco: Never   Vaping Use    Vaping status: Never Used   Substance and Sexual Activity    Drug use: Never    Sexual activity: Not Currently       No family history on file.    Objective    Temp 98.5 °F (36.9 °C)   Ht 175.3 cm (69\")   Wt 114 kg (251 lb)   BMI 37.07 kg/m²     Physical Exam  Genitourinary:     Penis: Paraphimosis, " erythema and swelling present.       Testes: Normal.      Comments: Swelling of the entire foreskin consistent with paraphimosis paraphimosis was reduced pulling foreskin over the glans.  There is swelling of the foreskin at the inferior side of the glans            Results for orders placed or performed in visit on 08/20/24   POC Urinalysis Dipstick, Multipro    Specimen: Urine   Result Value Ref Range    Color Yellow Yellow, Straw, Dark Yellow, Alexandra    Clarity, UA Clear Clear    Glucose, UA 1000 mg/dL (3+) Negative mg/dL    Bilirubin Small (1+) (A) Negative    Ketones, UA Trace (A) Negative    Specific Gravity  1.030 1.005 - 1.030    Blood, UA Negative Negative    pH, Urine 5.5 5.0 - 8.0    Protein,  mg/dL (A) Negative mg/dL    Urobilinogen, UA Normal Normal, 0.2 E.U./dL    Nitrite, UA Negative Negative    Leukocytes Negative Negative     Assessment and Plan    Diagnoses and all orders for this visit:    1. Penile swelling (Primary)  -     POC Urinalysis Dipstick, Multipro  -     clotrimazole-betamethasone (LOTRISONE) 1-0.05 % cream; Apply 1 Application topically to the appropriate area as directed 2 (Two) Times a Day for 28 days. After cleaning the affected area, apply twice daily as directed  Dispense: 45 g; Refill: 0    2. Paraphimosis    3. Balanoposthitis      Patient had paraphimosis there is considerable swelling of the foreskin with classic findings of a trapped foreskin.  I went ahead and reduced the paraphimosis pulling the foreskin back over the glans.  He may have some fungal infection which he can continue to treat which I prescribed Lotrisone for.    : Did not pull the foreskin back until the swelling is decreased which it should now that it has been reduced.

## 2024-08-20 ENCOUNTER — OFFICE VISIT (OUTPATIENT)
Dept: UROLOGY | Facility: CLINIC | Age: 34
End: 2024-08-20
Payer: COMMERCIAL

## 2024-08-20 VITALS — TEMPERATURE: 98.5 F | HEIGHT: 69 IN | BODY MASS INDEX: 37.18 KG/M2 | WEIGHT: 251 LBS

## 2024-08-20 DIAGNOSIS — N47.6 BALANOPOSTHITIS: ICD-10-CM

## 2024-08-20 DIAGNOSIS — N47.2 PARAPHIMOSIS: ICD-10-CM

## 2024-08-20 DIAGNOSIS — N48.89 PENILE SWELLING: Primary | ICD-10-CM

## 2024-08-20 LAB
BILIRUB BLD-MCNC: ABNORMAL MG/DL
CLARITY, POC: CLEAR
COLOR UR: YELLOW
GLUCOSE UR STRIP-MCNC: ABNORMAL MG/DL
KETONES UR QL: ABNORMAL
LEUKOCYTE EST, POC: NEGATIVE
NITRITE UR-MCNC: NEGATIVE MG/ML
PH UR: 5.5 [PH] (ref 5–8)
PROT UR STRIP-MCNC: ABNORMAL MG/DL
RBC # UR STRIP: NEGATIVE /UL
SP GR UR: 1.03 (ref 1–1.03)
UROBILINOGEN UR QL: NORMAL

## 2024-08-20 RX ORDER — BLOOD-GLUCOSE METER
KIT MISCELLANEOUS SEE ADMIN INSTRUCTIONS
COMMUNITY
Start: 2024-08-07

## 2024-08-20 RX ORDER — CLOTRIMAZOLE AND BETAMETHASONE DIPROPIONATE 10; .64 MG/G; MG/G
1 CREAM TOPICAL 2 TIMES DAILY
Qty: 45 G | Refills: 0 | Status: SHIPPED | OUTPATIENT
Start: 2024-08-20 | End: 2024-09-17

## 2024-09-11 ENCOUNTER — OFFICE VISIT (OUTPATIENT)
Dept: FAMILY MEDICINE CLINIC | Age: 34
End: 2024-09-11
Payer: COMMERCIAL

## 2024-09-11 VITALS
HEIGHT: 70 IN | DIASTOLIC BLOOD PRESSURE: 66 MMHG | TEMPERATURE: 97.4 F | WEIGHT: 251 LBS | HEART RATE: 92 BPM | BODY MASS INDEX: 35.93 KG/M2 | OXYGEN SATURATION: 97 % | SYSTOLIC BLOOD PRESSURE: 128 MMHG

## 2024-09-11 DIAGNOSIS — E11.9 TYPE 2 DIABETES MELLITUS WITHOUT COMPLICATION, WITHOUT LONG-TERM CURRENT USE OF INSULIN (HCC): Primary | ICD-10-CM

## 2024-09-11 PROCEDURE — 99213 OFFICE O/P EST LOW 20 MIN: CPT | Performed by: FAMILY MEDICINE

## 2024-09-11 PROCEDURE — 3046F HEMOGLOBIN A1C LEVEL >9.0%: CPT | Performed by: FAMILY MEDICINE

## 2024-09-11 ASSESSMENT — ENCOUNTER SYMPTOMS
RESPIRATORY NEGATIVE: 1
ALLERGIC/IMMUNOLOGIC NEGATIVE: 1
GASTROINTESTINAL NEGATIVE: 1
EYES NEGATIVE: 1

## 2024-10-04 NOTE — PROGRESS NOTES
Subjective    Mr. Alejo is 34 y.o. male    Chief Complaint: Penile swelling    History of Present Illness  Patient who I saw 08/20/2024 who was having penile swelling and penile pain.  Patient is noncircumcised.  Examined him he had an obvious paraphimosis.  I went ahead and reduced the paraphimosis pulling the foreskin back over the glans.  Swelling has now resolved.  Treated with Lotrisone cream he has mainly discomfort around the edge of the foreskin when he pulls back over the glans otherwise there is no pain when it is pulled over.  He is worried if this due to discomfort and discomfort when pulling foreskin over the glans will cause any long-term damage.      The following portions of the patient's history were reviewed and updated as appropriate: allergies, current medications, past family history, past medical history, past social history, past surgical history and problem list.    Review of Systems   Genitourinary:  Positive for penile pain.         Current Outpatient Medications:     Blood Glucose Monitoring Suppl (FreeStyle Freedom Lite) w/Device kit, See Admin Instructions., Disp: , Rfl:     metFORMIN (GLUCOPHAGE) 500 MG tablet, Take 1 tablet by mouth., Disp: , Rfl:     Ozempic, 0.25 or 0.5 MG/DOSE, 2 MG/3ML solution pen-injector, 0.25 mg., Disp: , Rfl:     Past Medical History:   Diagnosis Date    Diabetes mellitus        History reviewed. No pertinent surgical history.    Social History     Socioeconomic History    Marital status: Single   Tobacco Use    Smoking status: Some Days     Current packs/day: 0.25     Average packs/day: 0.3 packs/day for 6.2 years (1.6 ttl pk-yrs)     Types: Cigarettes     Start date: 8/20/2018    Smokeless tobacco: Never   Vaping Use    Vaping status: Never Used   Substance and Sexual Activity    Alcohol use: Not Currently     Comment: Social gatherings a few times a year    Drug use: Never    Sexual activity: Not Currently     Partners: Male     Birth control/protection:  "Condom       History reviewed. No pertinent family history.    Objective    Temp 98.3 °F (36.8 °C)   Ht 175.3 cm (69\")   Wt 112 kg (247 lb)   BMI 36.48 kg/m²     Physical Exam  Constitutional:       Appearance: Normal appearance.   HENT:      Head: Normocephalic and atraumatic.   Pulmonary:      Effort: Pulmonary effort is normal.   Genitourinary:     Penis: Uncircumcised.       Comments: There is a small area involving the edge of the foreskin that appears to have slight fissure or splitting.  Skin:     Coloration: Skin is not pale.   Neurological:      Mental Status: He is alert.   Psychiatric:         Mood and Affect: Mood normal.         Behavior: Behavior normal.             Results for orders placed or performed in visit on 08/20/24   POC Urinalysis Dipstick, Multipro    Specimen: Urine   Result Value Ref Range    Color Yellow Yellow, Straw, Dark Yellow, Alexandra    Clarity, UA Clear Clear    Glucose, UA 1000 mg/dL (3+) Negative mg/dL    Bilirubin Small (1+) (A) Negative    Ketones, UA Trace (A) Negative    Specific Gravity  1.030 1.005 - 1.030    Blood, UA Negative Negative    pH, Urine 5.5 5.0 - 8.0    Protein,  mg/dL (A) Negative mg/dL    Urobilinogen, UA Normal Normal, 0.2 E.U./dL    Nitrite, UA Negative Negative    Leukocytes Negative Negative     Assessment and Plan    Diagnoses and all orders for this visit:    1. Foreskin fissure (Primary)      Patient with prior paraphimosis which I reduced in the office I also had evidence of infection involving the foreskin I treated him with Lotrisone cream swelling is completely resolved he has no difficulties pulling the foreskin back and forth he just at times when he pulls the foreskin back over the glans on the superior aspect of the foreskin at the edges there is slight fissuring which is causing some pain.  Reassured patient I see no obvious abnormalities I do not see that there would be any long-term harm.  I did recommend continuing to use the " Lotrisone he could also purchase pain gel or lidocaine gel and apply it with a Q-tip I hope over time this will resolve this could still be sequela of the paraphimosis.    Follow-up as needed

## 2024-10-09 ENCOUNTER — OFFICE VISIT (OUTPATIENT)
Dept: UROLOGY | Facility: CLINIC | Age: 34
End: 2024-10-09
Payer: COMMERCIAL

## 2024-10-09 VITALS — TEMPERATURE: 98.3 F | HEIGHT: 69 IN | WEIGHT: 247 LBS | BODY MASS INDEX: 36.58 KG/M2

## 2024-10-09 DIAGNOSIS — N48.89 FORESKIN FISSURE: Primary | ICD-10-CM

## 2024-10-09 RX ORDER — SEMAGLUTIDE 0.68 MG/ML
0.25 INJECTION, SOLUTION SUBCUTANEOUS
COMMUNITY
Start: 2024-09-12

## 2024-11-27 DIAGNOSIS — R73.02 GLUCOSE INTOLERANCE (IMPAIRED GLUCOSE TOLERANCE): ICD-10-CM

## 2024-11-27 DIAGNOSIS — E11.9 TYPE 2 DIABETES MELLITUS WITHOUT COMPLICATION, WITHOUT LONG-TERM CURRENT USE OF INSULIN (HCC): ICD-10-CM

## 2024-11-27 LAB
ALBUMIN SERPL-MCNC: 4.3 G/DL (ref 3.5–5.2)
ALP SERPL-CCNC: 103 U/L (ref 40–129)
ALT SERPL-CCNC: 43 U/L (ref 5–41)
ANION GAP SERPL CALCULATED.3IONS-SCNC: 11 MMOL/L (ref 7–19)
AST SERPL-CCNC: 27 U/L (ref 5–40)
BACTERIA URNS QL MICRO: NEGATIVE /HPF
BILIRUB SERPL-MCNC: 0.4 MG/DL (ref 0.2–1.2)
BILIRUB UR QL STRIP: NEGATIVE
BUN SERPL-MCNC: 14 MG/DL (ref 6–20)
CALCIUM SERPL-MCNC: 9.5 MG/DL (ref 8.6–10)
CHLORIDE SERPL-SCNC: 102 MMOL/L (ref 98–111)
CHOLEST SERPL-MCNC: 161 MG/DL (ref 0–199)
CLARITY UR: CLEAR
CO2 SERPL-SCNC: 26 MMOL/L (ref 22–29)
COLOR UR: ABNORMAL
CREAT SERPL-MCNC: 0.7 MG/DL (ref 0.7–1.2)
CREAT UR-MCNC: 249.6 MG/DL (ref 39–259)
CRYSTALS URNS MICRO: NORMAL /HPF
EPI CELLS #/AREA URNS AUTO: 2 /HPF (ref 0–5)
ERYTHROCYTE [DISTWIDTH] IN BLOOD BY AUTOMATED COUNT: 13.4 % (ref 11.5–14.5)
GLUCOSE SERPL-MCNC: 88 MG/DL (ref 70–99)
GLUCOSE UR STRIP.AUTO-MCNC: NEGATIVE MG/DL
HBA1C MFR BLD: 5.8 % (ref 4–5.6)
HCT VFR BLD AUTO: 48.1 % (ref 42–52)
HCV AB SERPL QL IA: NORMAL
HDLC SERPL-MCNC: 33 MG/DL (ref 40–60)
HGB BLD-MCNC: 14.9 G/DL (ref 14–18)
HGB UR STRIP.AUTO-MCNC: NEGATIVE MG/L
HYALINE CASTS #/AREA URNS AUTO: 3 /HPF (ref 0–8)
KETONES UR STRIP.AUTO-MCNC: NEGATIVE MG/DL
LDLC SERPL CALC-MCNC: 109 MG/DL
LEUKOCYTE ESTERASE UR QL STRIP.AUTO: NEGATIVE
MCH RBC QN AUTO: 27.6 PG (ref 27–31)
MCHC RBC AUTO-ENTMCNC: 31 G/DL (ref 33–37)
MCV RBC AUTO: 89.1 FL (ref 80–94)
MICROALBUMIN UR-MCNC: 7.3 MG/DL (ref 0–1.99)
MICROALBUMIN/CREAT UR-RTO: 29.2 MG/G
NITRITE UR QL STRIP.AUTO: NEGATIVE
PH UR STRIP.AUTO: 7 [PH] (ref 5–8)
PLATELET # BLD AUTO: 323 K/UL (ref 130–400)
PMV BLD AUTO: 9.6 FL (ref 9.4–12.4)
POTASSIUM SERPL-SCNC: 4.1 MMOL/L (ref 3.5–5)
PROT SERPL-MCNC: 8.1 G/DL (ref 6.4–8.3)
PROT UR STRIP.AUTO-MCNC: 30 MG/DL
RBC # BLD AUTO: 5.4 M/UL (ref 4.7–6.1)
RBC #/AREA URNS AUTO: 1 /HPF (ref 0–4)
SODIUM SERPL-SCNC: 139 MMOL/L (ref 136–145)
SP GR UR STRIP.AUTO: 1.03 (ref 1–1.03)
TRIGL SERPL-MCNC: 96 MG/DL (ref 0–149)
UROBILINOGEN UR STRIP.AUTO-MCNC: 1 E.U./DL
WBC # BLD AUTO: 8.6 K/UL (ref 4.8–10.8)
WBC #/AREA URNS AUTO: 4 /HPF (ref 0–5)

## 2024-12-05 ENCOUNTER — OFFICE VISIT (OUTPATIENT)
Dept: FAMILY MEDICINE CLINIC | Age: 34
End: 2024-12-05

## 2024-12-05 VITALS
HEIGHT: 70 IN | TEMPERATURE: 97.2 F | SYSTOLIC BLOOD PRESSURE: 122 MMHG | WEIGHT: 251 LBS | OXYGEN SATURATION: 99 % | BODY MASS INDEX: 35.93 KG/M2 | HEART RATE: 78 BPM | DIASTOLIC BLOOD PRESSURE: 66 MMHG

## 2024-12-05 DIAGNOSIS — Z23 NEED FOR INFLUENZA VACCINATION: ICD-10-CM

## 2024-12-05 DIAGNOSIS — E11.9 TYPE 2 DIABETES MELLITUS WITHOUT COMPLICATION, WITHOUT LONG-TERM CURRENT USE OF INSULIN (HCC): Primary | ICD-10-CM

## 2024-12-05 DIAGNOSIS — Z23 NEED FOR PNEUMOCOCCAL 20-VALENT CONJUGATE VACCINATION: ICD-10-CM

## 2024-12-05 SDOH — ECONOMIC STABILITY: FOOD INSECURITY: WITHIN THE PAST 12 MONTHS, THE FOOD YOU BOUGHT JUST DIDN'T LAST AND YOU DIDN'T HAVE MONEY TO GET MORE.: NEVER TRUE

## 2024-12-05 SDOH — ECONOMIC STABILITY: FOOD INSECURITY: WITHIN THE PAST 12 MONTHS, YOU WORRIED THAT YOUR FOOD WOULD RUN OUT BEFORE YOU GOT MONEY TO BUY MORE.: NEVER TRUE

## 2024-12-05 SDOH — ECONOMIC STABILITY: INCOME INSECURITY: HOW HARD IS IT FOR YOU TO PAY FOR THE VERY BASICS LIKE FOOD, HOUSING, MEDICAL CARE, AND HEATING?: NOT HARD AT ALL

## 2024-12-05 ASSESSMENT — ENCOUNTER SYMPTOMS
ALLERGIC/IMMUNOLOGIC NEGATIVE: 1
RESPIRATORY NEGATIVE: 1
EYES NEGATIVE: 1
GASTROINTESTINAL NEGATIVE: 1

## 2024-12-05 ASSESSMENT — PATIENT HEALTH QUESTIONNAIRE - PHQ9
2. FEELING DOWN, DEPRESSED OR HOPELESS: NOT AT ALL
SUM OF ALL RESPONSES TO PHQ QUESTIONS 1-9: 0
SUM OF ALL RESPONSES TO PHQ9 QUESTIONS 1 & 2: 0
SUM OF ALL RESPONSES TO PHQ QUESTIONS 1-9: 0
1. LITTLE INTEREST OR PLEASURE IN DOING THINGS: NOT AT ALL
SUM OF ALL RESPONSES TO PHQ QUESTIONS 1-9: 0
SUM OF ALL RESPONSES TO PHQ QUESTIONS 1-9: 0

## 2025-01-09 DIAGNOSIS — E11.9 TYPE 2 DIABETES MELLITUS WITHOUT COMPLICATION, WITHOUT LONG-TERM CURRENT USE OF INSULIN (HCC): ICD-10-CM

## 2025-05-22 DIAGNOSIS — E11.9 TYPE 2 DIABETES MELLITUS WITHOUT COMPLICATION, WITHOUT LONG-TERM CURRENT USE OF INSULIN (HCC): ICD-10-CM

## 2025-05-22 RX ORDER — SEMAGLUTIDE 0.68 MG/ML
0.5 INJECTION, SOLUTION SUBCUTANEOUS WEEKLY
Qty: 3 ML | Refills: 5 | Status: SHIPPED | OUTPATIENT
Start: 2025-05-22

## 2025-07-03 DIAGNOSIS — E11.9 TYPE 2 DIABETES MELLITUS WITHOUT COMPLICATION, WITHOUT LONG-TERM CURRENT USE OF INSULIN (HCC): ICD-10-CM
